# Patient Record
Sex: FEMALE | Race: BLACK OR AFRICAN AMERICAN | NOT HISPANIC OR LATINO | Employment: FULL TIME | URBAN - METROPOLITAN AREA
[De-identification: names, ages, dates, MRNs, and addresses within clinical notes are randomized per-mention and may not be internally consistent; named-entity substitution may affect disease eponyms.]

---
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
## 2023-01-11 DIAGNOSIS — R51.9 HEAD ACHE: Primary | ICD-10-CM

## 2023-01-12 DIAGNOSIS — J32.0 CHRONIC MAXILLARY SINUSITIS: Primary | ICD-10-CM

## 2023-02-08 DIAGNOSIS — N63.0 LUMP, BREAST: Primary | ICD-10-CM

## 2023-02-20 ENCOUNTER — HOSPITAL ENCOUNTER (OUTPATIENT)
Dept: RADIOLOGY | Facility: HOSPITAL | Age: 18
Discharge: HOME | End: 2023-02-20
Attending: INTERNAL MEDICINE
Payer: COMMERCIAL

## 2023-02-20 DIAGNOSIS — N63.0 LUMP, BREAST: ICD-10-CM

## 2023-02-20 PROCEDURE — 76642 ULTRASOUND BREAST LIMITED: CPT | Mod: TC,RT

## 2023-02-20 PROCEDURE — 76642 US BREAST RIGHT LIMITED: ICD-10-PCS | Mod: 26,RT,, | Performed by: STUDENT IN AN ORGANIZED HEALTH CARE EDUCATION/TRAINING PROGRAM

## 2023-02-20 PROCEDURE — 76642 ULTRASOUND BREAST LIMITED: CPT | Mod: 26,RT,, | Performed by: STUDENT IN AN ORGANIZED HEALTH CARE EDUCATION/TRAINING PROGRAM

## 2023-07-27 DIAGNOSIS — N63.0 BREAST LUMP: Primary | ICD-10-CM

## 2023-08-14 ENCOUNTER — OFFICE VISIT (OUTPATIENT)
Dept: SURGERY | Facility: CLINIC | Age: 18
End: 2023-08-14
Payer: COMMERCIAL

## 2023-08-14 VITALS
BODY MASS INDEX: 23.03 KG/M2 | SYSTOLIC BLOOD PRESSURE: 123 MMHG | HEART RATE: 70 BPM | DIASTOLIC BLOOD PRESSURE: 68 MMHG | RESPIRATION RATE: 18 BRPM | OXYGEN SATURATION: 100 % | WEIGHT: 122 LBS | TEMPERATURE: 98 F | HEIGHT: 61 IN

## 2023-08-14 DIAGNOSIS — Z91.89 AT HIGH RISK FOR BREAST CANCER: Primary | ICD-10-CM

## 2023-08-14 DIAGNOSIS — Z80.3 FAMILY HISTORY OF BREAST CANCER: ICD-10-CM

## 2023-08-14 DIAGNOSIS — R92.30 DENSE BREAST TISSUE: ICD-10-CM

## 2023-08-14 PROCEDURE — 1159F MED LIST DOCD IN RCRD: CPT | Mod: CPTII,S$GLB,, | Performed by: STUDENT IN AN ORGANIZED HEALTH CARE EDUCATION/TRAINING PROGRAM

## 2023-08-14 PROCEDURE — 3078F PR MOST RECENT DIASTOLIC BLOOD PRESSURE < 80 MM HG: ICD-10-PCS | Mod: CPTII,S$GLB,, | Performed by: STUDENT IN AN ORGANIZED HEALTH CARE EDUCATION/TRAINING PROGRAM

## 2023-08-14 PROCEDURE — 3074F SYST BP LT 130 MM HG: CPT | Mod: CPTII,S$GLB,, | Performed by: STUDENT IN AN ORGANIZED HEALTH CARE EDUCATION/TRAINING PROGRAM

## 2023-08-14 PROCEDURE — 3074F PR MOST RECENT SYSTOLIC BLOOD PRESSURE < 130 MM HG: ICD-10-PCS | Mod: CPTII,S$GLB,, | Performed by: STUDENT IN AN ORGANIZED HEALTH CARE EDUCATION/TRAINING PROGRAM

## 2023-08-14 PROCEDURE — 99204 OFFICE O/P NEW MOD 45 MIN: CPT | Mod: S$GLB,,, | Performed by: STUDENT IN AN ORGANIZED HEALTH CARE EDUCATION/TRAINING PROGRAM

## 2023-08-14 PROCEDURE — 3078F DIAST BP <80 MM HG: CPT | Mod: CPTII,S$GLB,, | Performed by: STUDENT IN AN ORGANIZED HEALTH CARE EDUCATION/TRAINING PROGRAM

## 2023-08-14 PROCEDURE — 1159F PR MEDICATION LIST DOCUMENTED IN MEDICAL RECORD: ICD-10-PCS | Mod: CPTII,S$GLB,, | Performed by: STUDENT IN AN ORGANIZED HEALTH CARE EDUCATION/TRAINING PROGRAM

## 2023-08-14 PROCEDURE — 1160F PR REVIEW ALL MEDS BY PRESCRIBER/CLIN PHARMACIST DOCUMENTED: ICD-10-PCS | Mod: CPTII,S$GLB,, | Performed by: STUDENT IN AN ORGANIZED HEALTH CARE EDUCATION/TRAINING PROGRAM

## 2023-08-14 PROCEDURE — 3008F BODY MASS INDEX DOCD: CPT | Mod: CPTII,S$GLB,, | Performed by: STUDENT IN AN ORGANIZED HEALTH CARE EDUCATION/TRAINING PROGRAM

## 2023-08-14 PROCEDURE — 3008F PR BODY MASS INDEX (BMI) DOCUMENTED: ICD-10-PCS | Mod: CPTII,S$GLB,, | Performed by: STUDENT IN AN ORGANIZED HEALTH CARE EDUCATION/TRAINING PROGRAM

## 2023-08-14 PROCEDURE — 99999 PR PBB SHADOW E&M-EST. PATIENT-LVL IV: CPT | Mod: PBBFAC,,, | Performed by: STUDENT IN AN ORGANIZED HEALTH CARE EDUCATION/TRAINING PROGRAM

## 2023-08-14 PROCEDURE — 1160F RVW MEDS BY RX/DR IN RCRD: CPT | Mod: CPTII,S$GLB,, | Performed by: STUDENT IN AN ORGANIZED HEALTH CARE EDUCATION/TRAINING PROGRAM

## 2023-08-14 PROCEDURE — 99204 PR OFFICE/OUTPT VISIT, NEW, LEVL IV, 45-59 MIN: ICD-10-PCS | Mod: S$GLB,,, | Performed by: STUDENT IN AN ORGANIZED HEALTH CARE EDUCATION/TRAINING PROGRAM

## 2023-08-14 PROCEDURE — 99999 PR PBB SHADOW E&M-EST. PATIENT-LVL IV: ICD-10-PCS | Mod: PBBFAC,,, | Performed by: STUDENT IN AN ORGANIZED HEALTH CARE EDUCATION/TRAINING PROGRAM

## 2023-08-14 RX ORDER — ERGOCALCIFEROL 1.25 MG/1
CAPSULE ORAL
COMMUNITY

## 2023-08-14 RX ORDER — MONTELUKAST SODIUM 10 MG/1
1 TABLET ORAL
COMMUNITY
End: 2023-10-05

## 2023-08-14 RX ORDER — FLUTICASONE PROPIONATE 50 MCG
SPRAY, SUSPENSION (ML) NASAL
COMMUNITY

## 2023-08-14 NOTE — PROGRESS NOTES
[facility] Breast Surg  Breast Surgical Oncology  New Patient Office Visit - H&P      Care Team: Patient Care Team:  No, Primary Doctor as PCP - General   Referring Provider: Dr. Celine Carnes    Chief Complaint:   Chief Complaint   Patient presents with    Breast Cyst     New patient w/ Breast Lump/cyst. No complaints of pain, swelling, d/c.          Subjective:      History of Present Illness:  Lenora Romero is a pleasant 18 y.o.female who presents as a referral from Dr. Carnes for palpable abnormality in the R breast UOQ and high risk evaluation.  Patient states she does not do regular self-breast exam.  She states her OB/GYN felt a mass in the upper-outer quadrant of the right breast back in January.  She underwent ultrasound of the right breast in February which did not show any suspicious findings.  She denies any other changes with her breasts.  She has never had any breast surgeries or biopsies in the past.  Her family history is significant for mom with breast cancer at 41, maternal aunt with breast cancer at 65, and paternal aunt with breast cancer at 40.  She is in school at  for business.    Imaging:   R breast US limited 2/20/23: Targeted sonographic evaluation of the right breast 9:00 -11:00 axes (including the palpable area of concern pointed out by the patient along the 10:00 axis) was performed, revealing no suspicious finding.  No sonographic correlate for the area of concern.  Additional real-time sonographic evaluation of the entire right breast was performed by me, revealing no suspicious sonographic finding.  No suspicious right axillary adenopathy.  While the right axillary lymph nodes are prominent, they are symmetric compared to the left axillary lymph nodes.    I have reviewed the imaging and agree with the radiologists interpretation. I have discussed these results with the patient.    Pathology:   none    OB / GYN History   Menarche  Onset:10  Menopause: Menopause: premenopausal  Hormonal birth control (duration): 0years  Pregnancies: 0  Age at first child birth: n/a  Child births: 0  Hysterectomy/Oophorectomy: no  HRT: no    Family History of Cancer (& age at diagnosis):  -   Family History   Problem Relation Age of Onset    Breast cancer Mother 41    Breast cancer Maternal Aunt 65    Breast cancer Paternal Aunt 40        Lifestyle:  Height and Weight:   BMI: Body mass index is 23.05 kg/m².     Other:  # of breast biopsies (when and pathology results): 0  MG breast density: No breast composition recorded.   Prior thoracic RT: none  Genetic testing: No  Ashkenazi Tenriism descent: No    Other History:     History reviewed. No pertinent past medical history.     History reviewed. No pertinent surgical history.     Social History     Socioeconomic History    Marital status: Single   Tobacco Use    Smoking status: Never    Smokeless tobacco: Never   Substance and Sexual Activity    Alcohol use: Never    Drug use: Never    Sexual activity: Not Currently          There is no immunization history on file for this patient.     Medications/Allergies:       Current Outpatient Medications   Medication Instructions    ergocalciferol (ERGOCALCIFEROL) 50,000 unit Cap take 1 capsule twice weekly x 4 weeks. When complete, take otc vitamin D 2000iu daily    fluticasone propionate (FLONASE) 50 mcg/actuation nasal spray As needed (PRN)    montelukast (SINGULAIR) 10 mg tablet 1 tablet, Oral, As needed (PRN)       Review of patient's allergies indicates:  No Known Allergies     Review of Systems:      ROS    Constitutional: denies fevers, chills, weight loss  HEENT: denies blurry/double vision, changes in hearing, odynophagia, dysphagia  Respiratory: denies cough, shortness of breath  Cardiovascular: denies palpitations, swelling of the extremities  GI: denies abdominal pain, nausea/vomiting, hematochezia, frequent stools  : denies frequency, dysuria, flank pain,  "hematuria  Skin: denies new rashes  Neurological: denies muscular/sensory deficiencies, loss of coordination, headaches, memory changes  Endo: denies hair loss/thinning, nervousness, hot flashes, heat/cold intolerance, lumps in the neck area  Heme: denies easy bruising and fatigue  Psychological: denies anxious/depressive moods  Musculoskeletal: denies bony pain, muscle cramps, swollen joints       Objective/Physical Exam   Visit Vitals  /68   Pulse 70   Temp 98.3 °F (36.8 °C) (Oral)   Resp 18   Ht 5' 1" (1.549 m)   Wt 55.3 kg (122 lb)   LMP 08/11/2023 (Exact Date)   SpO2 100%   BMI 23.05 kg/m²        Physical Exam    General: The patient is awake, alert and oriented. The patient is well nourished. No acute distress.  Neck: The neck is supple. The thyroid is not enlarged.  Musculoskeletal: The patient has a normal range of motion of her bilateral upper extremities.  Heart: Regular rate and rhythm, no murmurs.   Lung: No increased work of breathing. Clear breath sounds bilaterally.  Lymph nodes: There is no axillary, supraclavicular or cervical lymphadenopathy.  Breast:  Right:   On inspection there is no skin dimpling, rashes, retraction, erythema or skin abnormalities. The nipple areolar complex is normal with an everted nipple. The breasts move normally with movement of the pectoralis muscle. On palpation there are no dominant masses.  Very dense breast tissue UOQ. The breast tissue has a nodularity of 10 out of 10. There is no tenderness. There is no nipple discharge.  Left:   On inspection there is no skin dimpling, rashes, retraction, erythema or skin abnormalities. The nipple areolar complex is normal with an everted nipple. The breasts move normally with movement of the pectoralis muscle. On palpation there are no dominant masses.  Very dense breast tissue UOQ. The breast tissue has a nodularity of 10 out of 10. There is no tenderness. There is no nipple discharge.     Assessment and Plan     1. At high " risk for breast cancer    2. Dense breast tissue  - Ambulatory referral/consult to Breast Surgery    3. Family history of breast cancer    Lenora Romero is a pleasant 18 y.o.female who presents today for high risk evaluation and palpable concern in the R breast UOQ.  US was done and did not show any suspicious findings. On exam she has very dense breast tissue bilaterally especially in the UOQ without a dominant mass.  Today she was plugged into the [employer] risk calculator and her calculated risk of breast cancer based on Tyrer - Cuzick Breast Cancer Risk Model was 36%.  She understands that >20% is considered high risk.  Today we discussed risk factors associated with the development of breast cancer and risk reduction strategies.         1. Lifestyle factors: As with other types of cancer, studies continue to show that various lifestyle factors may contribute to the development of breast cancer.     Weight: Recent studies have shown that postmenopausal women who are overweight or obese have an increased risk of breast cancer. These women also have a higher risk of having the cancer come back after treatment.     Physical activity: Decreased physical activity is associated with an increased risk of developing breast cancer and a higher risk of having the cancer come back after treatment. Regular physical activity may protect against breast cancer by helping women maintain a healthy body weight, lowering hormone levels, or causing changes in a womens metabolism or immune factors.     Alcohol: Current research suggests that having more than 1 to 2 alcoholic drinks, including beer, wine, and spirits, per day raises the risk of breast cancer, as well as the risk of having the cancer come back after treatment.     Food: There is no reliable research that confirms that eating or avoiding specific foods reduces the risk of developing breast cancer or having the cancer come back after treatment. However,  eating more fruits and vegetables and fewer animal fats is linked with many health benefits.     2. Prevention:  Surgery to lower cancer risk and Drugs to lower cancer risk (Chemoprevention) were not discussed at todays visit.       3. Surveillance: Women with a known genetic mutation should follow screening guidelines per the NCCN guidelines. Women with no known genetic mutation  and found to be at greater than 20 percent average lifetime risk of breast cancer are recommended for the following screening recommendations:     Clinical Breast exam: Every 6-12 months starting at age found to be at increased risk by risk model     Mammogram: Per NCCN guidelines, recommended every year starting 10 years younger than the youngest breast cancer case in the family (but not before age 30). May consider beginning breast MRIs at age 30 per ACR guidelines if desired by patient or other clinical considerations. May also consider getting a baseline MG at time of initial high risk consultation.     Breast MRI: Per NCCN guidelines, recommended every year starting 10 years younger than the youngest breast cancer case in the family (but not before age 25). May consider beginning breast MRIs at age 30 per ACR guidelines if desired by patient or other clinical considerations. May also consider getting a baseline MG at time of initial high risk consultation. If patient has a first-degree relative with a BRCA1/2 gene mutation, youre encouraged to get genetic counseling and/or testing before getting MRI as part of screening (for those who do not wish to have genetic testing, MRI is recommended). Breast MRI in combination with mammography is better than mammography alone at finding breast cancer in certain women at higher than average risk.     --------------------------------------------------------------------------------------------------------------    PLAN:    1. Lifestyle - Healthy lifestyle guidelines were reviewed. She was  encouraged to engage in regular exercise, maintain a healthy body weight, and avoid excessive alcohol consumption. Healthy nutritional guidelines were also discussed.      2. Surveillance - She desires undergoing high risk screening with annual screening mammograms and breast MRIs which typically start at age 25 or 10 years prior to youngest family member that was diagnosed.      3. Genetics - According to NCCN guidelines, she does meet criteria for testing but is not interested at this time.     We also recommend and discussed performing a self breast exam monthly.  If she notices any changes in her breast exam in between visits she was encouraged to call the breast center to be seen as soon as possible. RTC in 3-6M for repeat breast exam to make sure there are no changes.  All questions were answered.        Stella Rainey MD  Breast Surgical Oncology       I spent a total of 45 minutes on the day of the visit.  This includes face to face time and non-face to face time preparing to see the patient (eg, review of tests), obtaining and/or reviewing separately obtained history, documenting clinical information in the electronic or other health record, independently interpreting results and communicating results to the patient/family/caregiver, or care coordinator.

## 2023-10-05 ENCOUNTER — HOSPITAL ENCOUNTER (EMERGENCY)
Facility: HOSPITAL | Age: 18
Discharge: HOME | End: 2023-10-05
Attending: INTERNAL MEDICINE
Payer: COMMERCIAL

## 2023-10-05 VITALS
HEIGHT: 61 IN | TEMPERATURE: 100 F | OXYGEN SATURATION: 98 % | DIASTOLIC BLOOD PRESSURE: 75 MMHG | BODY MASS INDEX: 21.36 KG/M2 | HEART RATE: 92 BPM | SYSTOLIC BLOOD PRESSURE: 119 MMHG | WEIGHT: 113.13 LBS | RESPIRATION RATE: 18 BRPM

## 2023-10-05 DIAGNOSIS — K59.00 CONSTIPATION: ICD-10-CM

## 2023-10-05 DIAGNOSIS — T78.40XA ALLERGIC REACTION, INITIAL ENCOUNTER: Primary | ICD-10-CM

## 2023-10-05 LAB
APPEARANCE UR: ABNORMAL
B-HCG UR QL: NEGATIVE
BILIRUB UR QL STRIP.AUTO: ABNORMAL
COLOR UR AUTO: ABNORMAL
CTP QC/QA: YES
GLUCOSE UR QL STRIP.AUTO: ABNORMAL
KETONES UR QL STRIP.AUTO: ABNORMAL
LEUKOCYTE ESTERASE UR QL STRIP.AUTO: ABNORMAL
NITRITE UR QL STRIP.AUTO: ABNORMAL
PH UR STRIP.AUTO: ABNORMAL [PH]
PROT UR QL STRIP.AUTO: ABNORMAL
RBC #/AREA URNS AUTO: >100 /HPF
RBC UR QL AUTO: ABNORMAL
SP GR UR STRIP.AUTO: 1.02 (ref 1–1.03)
UROBILINOGEN UR STRIP-ACNC: ABNORMAL
WBC #/AREA URNS AUTO: ABNORMAL /HPF

## 2023-10-05 PROCEDURE — 81001 URINALYSIS AUTO W/SCOPE: CPT | Performed by: INTERNAL MEDICINE

## 2023-10-05 PROCEDURE — 81025 URINE PREGNANCY TEST: CPT | Performed by: INTERNAL MEDICINE

## 2023-10-05 PROCEDURE — 96372 THER/PROPH/DIAG INJ SC/IM: CPT | Performed by: INTERNAL MEDICINE

## 2023-10-05 PROCEDURE — 87088 URINE BACTERIA CULTURE: CPT | Performed by: INTERNAL MEDICINE

## 2023-10-05 PROCEDURE — 99284 EMERGENCY DEPT VISIT MOD MDM: CPT

## 2023-10-05 PROCEDURE — 25000003 PHARM REV CODE 250: Performed by: INTERNAL MEDICINE

## 2023-10-05 PROCEDURE — 63600175 PHARM REV CODE 636 W HCPCS: Performed by: INTERNAL MEDICINE

## 2023-10-05 RX ORDER — CETIRIZINE HYDROCHLORIDE 10 MG/1
10 TABLET ORAL
Status: COMPLETED | OUTPATIENT
Start: 2023-10-05 | End: 2023-10-05

## 2023-10-05 RX ORDER — MONTELUKAST SODIUM 5 MG/1
10 TABLET, CHEWABLE ORAL DAILY
Status: DISCONTINUED | OUTPATIENT
Start: 2023-10-05 | End: 2023-10-05 | Stop reason: HOSPADM

## 2023-10-05 RX ORDER — DEXAMETHASONE SODIUM PHOSPHATE 4 MG/ML
8 INJECTION, SOLUTION INTRA-ARTICULAR; INTRALESIONAL; INTRAMUSCULAR; INTRAVENOUS; SOFT TISSUE
Status: COMPLETED | OUTPATIENT
Start: 2023-10-05 | End: 2023-10-05

## 2023-10-05 RX ORDER — MONTELUKAST SODIUM 10 MG/1
10 TABLET ORAL NIGHTLY
Qty: 10 TABLET | Refills: 0 | Status: SHIPPED | OUTPATIENT
Start: 2023-10-05 | End: 2023-10-15

## 2023-10-05 RX ORDER — PREDNISONE 20 MG/1
40 TABLET ORAL DAILY
Qty: 10 TABLET | Refills: 0 | Status: SHIPPED | OUTPATIENT
Start: 2023-10-05 | End: 2023-10-10

## 2023-10-05 RX ADMIN — MONTELUKAST SODIUM 10 MG: 5 TABLET, CHEWABLE ORAL at 08:10

## 2023-10-05 RX ADMIN — DEXAMETHASONE SODIUM PHOSPHATE 8 MG: 4 INJECTION, SOLUTION INTRA-ARTICULAR; INTRALESIONAL; INTRAMUSCULAR; INTRAVENOUS; SOFT TISSUE at 08:10

## 2023-10-05 RX ADMIN — CETIRIZINE HYDROCHLORIDE 10 MG: 10 TABLET, FILM COATED ORAL at 08:10

## 2023-10-05 NOTE — ED PROVIDER NOTES
"Encounter Date: 10/5/2023       History     Chief Complaint   Patient presents with    Allergic Reaction     C/o eye swelling and lip swelling x 2 days since started on meds Tuesday . Stated went to urgent care due to being sick sunday     Presents concern about an allergic reaction. States went to a local urgent care due to constipation and was diagnosed with UTI. Bactrim, Zofran and lactulose were prescribed after which started having swelling lips, periorbital area and feeling her throat also "funny".  No rash.    The history is provided by the patient and a relative.     Review of patient's allergies indicates:  No Known Allergies  History reviewed. No pertinent past medical history.  History reviewed. No pertinent surgical history.  Family History   Problem Relation Age of Onset    Breast cancer Mother 41    Breast cancer Maternal Aunt 65    Breast cancer Paternal Aunt 40     Social History     Tobacco Use    Smoking status: Never    Smokeless tobacco: Never   Substance Use Topics    Alcohol use: Never    Drug use: Never     Review of Systems   Constitutional:  Negative for fever.   HENT:  Positive for facial swelling. Negative for sore throat, trouble swallowing and voice change.    Respiratory:  Negative for shortness of breath.    Cardiovascular:  Negative for chest pain.   Gastrointestinal:  Positive for constipation and nausea.   Genitourinary:  Positive for vaginal bleeding (In her menstrual period). Negative for dysuria.   Musculoskeletal:  Negative for back pain.   Skin:  Negative for rash.   Neurological:  Negative for weakness.   Hematological:  Does not bruise/bleed easily.   All other systems reviewed and are negative.      Physical Exam     Initial Vitals [10/05/23 0753]   BP Pulse Resp Temp SpO2   106/66 110 20 99.7 °F (37.6 °C) 98 %      MAP       --         Physical Exam    Nursing note and vitals reviewed.  Constitutional: She appears well-developed and well-nourished. No distress.   HENT: "   Head: Normocephalic and atraumatic.   Nose: Nose normal.   Mouth/Throat: Oropharynx is clear and moist. No oropharyngeal exudate.   Mild lips swelling and periorbital   Eyes: Conjunctivae and EOM are normal. Pupils are equal, round, and reactive to light.   Neck: Neck supple. No JVD present.   Normal range of motion.  Cardiovascular:  Normal rate, regular rhythm, normal heart sounds and intact distal pulses.           Pulmonary/Chest: Breath sounds normal. No stridor.   Abdominal: Abdomen is soft. Bowel sounds are normal. She exhibits no distension. There is no abdominal tenderness. There is no rebound and no guarding.   Musculoskeletal:         General: No edema. Normal range of motion.      Cervical back: Normal range of motion and neck supple.     Neurological: She is alert and oriented to person, place, and time. She has normal strength. GCS score is 15. GCS eye subscore is 4. GCS verbal subscore is 5. GCS motor subscore is 6.   Skin: Skin is warm and dry. No rash noted.   Psychiatric: Her behavior is normal.         ED Course   Procedures  Labs Reviewed   URINALYSIS - Abnormal; Notable for the following components:       Result Value    Color, UA Red (*)     Appearance, UA Bloody (*)     WBC, UA 11-20 (*)     RBC, UA >100 (*)     All other components within normal limits   CULTURE, URINE   POCT URINE PREGNANCY          Imaging Results              X-Ray Abdomen Flat And Erect (Final result)  Result time 10/05/23 09:08:57      Final result by Lion Hurst MD (10/05/23 09:08:57)                   Impression:      No acute radiographic findings.      Electronically signed by: Lion Hurst  Date:    10/05/2023  Time:    09:08               Narrative:    EXAMINATION:  XR ABDOMEN FLAT AND ERECT    CLINICAL HISTORY:  Constipation, unspecified    COMPARISON:  None    FINDINGS:  Flat and upright views of the abdomen demonstrate a nonspecific, nonobstructive bowel gas pattern.  Moderate stool is scattered in the  colon.  No free air is seen.                                       Medications   montelukast chewable tablet 10 mg (10 mg Oral Given 10/5/23 0819)   dexAMETHasone injection 8 mg (8 mg Intramuscular Given 10/5/23 0819)   cetirizine tablet 10 mg (10 mg Oral Given 10/5/23 0819)     Medical Decision Making  Amount and/or Complexity of Data Reviewed  Labs: ordered.  Radiology: ordered.    Risk  OTC drugs.  Prescription drug management.                               Clinical Impression:   Final diagnoses:  [K59.00] Constipation  [T78.40XA] Allergic reaction, initial encounter (Primary)        ED Disposition Condition    Discharge Stable          ED Prescriptions       Medication Sig Dispense Start Date End Date Auth. Provider    predniSONE (DELTASONE) 20 MG tablet Take 2 tablets (40 mg total) by mouth once daily. for 5 days 10 tablet 10/5/2023 10/10/2023 Tano Garcia MD    montelukast (SINGULAIR) 10 mg tablet Take 1 tablet (10 mg total) by mouth every evening. for 10 days 10 tablet 10/5/2023 10/15/2023 Tano Garcia MD          Follow-up Information       Follow up With Specialties Details Why Contact Info Additional Information    [facility] Emergency Medicine  If symptoms worsen [facility] [facility] [address] [phone]     [facility] Internal Medicine Schedule an appointment as soon as possible for a visit in 2 months  [facility] [facility] [address] [phone] Internal Medicine Clinic Entrance #1             Tano Garcia MD  10/05/23 0928

## 2023-10-05 NOTE — Clinical Note
"Lenora "Lenora" Nick was seen and treated in our emergency department on 10/5/2023.  She may return to work on 10/06/2023.       If you have any questions or concerns, please don't hesitate to call.       RN    "

## 2023-10-05 NOTE — Clinical Note
"Lenora Knox" Nick was seen and treated in our emergency department on 10/5/2023.  She may return to school on 10/06/2023.      If you have any questions or concerns, please don't hesitate to call.       RN"

## 2023-10-07 LAB — BACTERIA UR CULT: NORMAL

## 2024-02-14 ENCOUNTER — OFFICE VISIT (OUTPATIENT)
Dept: SURGERY | Facility: CLINIC | Age: 19
End: 2024-02-14
Payer: COMMERCIAL

## 2024-02-14 VITALS
HEIGHT: 61 IN | DIASTOLIC BLOOD PRESSURE: 65 MMHG | WEIGHT: 124 LBS | HEART RATE: 79 BPM | RESPIRATION RATE: 18 BRPM | TEMPERATURE: 98 F | OXYGEN SATURATION: 99 % | SYSTOLIC BLOOD PRESSURE: 111 MMHG | BODY MASS INDEX: 23.41 KG/M2

## 2024-02-14 DIAGNOSIS — Z12.39 ENCOUNTER FOR BREAST CANCER SCREENING OTHER THAN MAMMOGRAM: ICD-10-CM

## 2024-02-14 DIAGNOSIS — Z91.89 AT HIGH RISK FOR BREAST CANCER: ICD-10-CM

## 2024-02-14 DIAGNOSIS — R92.30 DENSE BREAST TISSUE: Primary | ICD-10-CM

## 2024-02-14 DIAGNOSIS — Z80.3 FAMILY HISTORY OF BREAST CANCER: ICD-10-CM

## 2024-02-14 PROCEDURE — 3008F BODY MASS INDEX DOCD: CPT | Mod: CPTII,S$GLB,, | Performed by: STUDENT IN AN ORGANIZED HEALTH CARE EDUCATION/TRAINING PROGRAM

## 2024-02-14 PROCEDURE — 3078F DIAST BP <80 MM HG: CPT | Mod: CPTII,S$GLB,, | Performed by: STUDENT IN AN ORGANIZED HEALTH CARE EDUCATION/TRAINING PROGRAM

## 2024-02-14 PROCEDURE — 1159F MED LIST DOCD IN RCRD: CPT | Mod: CPTII,S$GLB,, | Performed by: STUDENT IN AN ORGANIZED HEALTH CARE EDUCATION/TRAINING PROGRAM

## 2024-02-14 PROCEDURE — 99213 OFFICE O/P EST LOW 20 MIN: CPT | Mod: S$GLB,,, | Performed by: STUDENT IN AN ORGANIZED HEALTH CARE EDUCATION/TRAINING PROGRAM

## 2024-02-14 PROCEDURE — 3074F SYST BP LT 130 MM HG: CPT | Mod: CPTII,S$GLB,, | Performed by: STUDENT IN AN ORGANIZED HEALTH CARE EDUCATION/TRAINING PROGRAM

## 2024-02-14 PROCEDURE — 99999 PR PBB SHADOW E&M-EST. PATIENT-LVL III: CPT | Mod: PBBFAC,,, | Performed by: STUDENT IN AN ORGANIZED HEALTH CARE EDUCATION/TRAINING PROGRAM

## 2024-02-14 PROCEDURE — 1160F RVW MEDS BY RX/DR IN RCRD: CPT | Mod: CPTII,S$GLB,, | Performed by: STUDENT IN AN ORGANIZED HEALTH CARE EDUCATION/TRAINING PROGRAM

## 2024-02-14 NOTE — PROGRESS NOTES
[facility] Breast Surg  Breast Surgical Oncology  FU Patient Office Visit - H&P      Care Team: Patient Care Team:  Celine Carnes MD as PCP - General (Internal Medicine)   Referring Provider: No ref. provider found    Chief Complaint:   Chief Complaint   Patient presents with    Follow-up     6 month follow up visit.          Subjective:      Interval: Lenora Romero is a 18 y.o.female who presents today for high risk FU. She is doing well. She denies any breast changes including new masses, skin changes, skin lesions, pain, nipple discharge or retraction.  Denies any changes in her family history.     History of Present Illness:  Lenora Romero is a pleasant 18 y.o.female who presents as a referral from Dr. Carnes for palpable abnormality in the R breast UOQ and high risk evaluation.  Patient states she does not do regular self-breast exam.  She states her OB/GYN felt a mass in the upper-outer quadrant of the right breast back in January.  She underwent ultrasound of the right breast in February which did not show any suspicious findings.  She denies any other changes with her breasts.  She has never had any breast surgeries or biopsies in the past.  Her family history is significant for mom with breast cancer at 41 who passed away from the cancer, maternal aunt with breast cancer at 65, and paternal aunt with breast cancer at 40.  She is in school at  for business and plans to get her masters.    Imaging:   R breast US limited 2/20/23: Targeted sonographic evaluation of the right breast 9:00 -11:00 axes (including the palpable area of concern pointed out by the patient along the 10:00 axis) was performed, revealing no suspicious finding.  No sonographic correlate for the area of concern.  Additional real-time sonographic evaluation of the entire right breast was performed by me, revealing no suspicious sonographic finding.  No suspicious right axillary adenopathy.  While the  right axillary lymph nodes are prominent, they are symmetric compared to the left axillary lymph nodes.    I have reviewed the imaging and agree with the radiologists interpretation. I have discussed these results with the patient.    Pathology:   none    OB / GYN History   Menarche Onset:10  Menopause: Menopause: premenopausal  Hormonal birth control (duration): 0years  Pregnancies: 0  Age at first child birth: n/a  Child births: 0  Hysterectomy/Oophorectomy: no  HRT: no    Family History of Cancer (& age at diagnosis):  -   Family History   Problem Relation Age of Onset    Breast cancer Mother 41    Breast cancer Maternal Aunt 65    Breast cancer Paternal Aunt 40        Lifestyle:  Height and Weight:   BMI: Body mass index is 23.43 kg/m².     Other:  # of breast biopsies (when and pathology results): 0  MG breast density:  Prior thoracic RT: none  Genetic testing: No  Ashkenazi Spiritism descent: No    Other History:     History reviewed. No pertinent past medical history.     History reviewed. No pertinent surgical history.     Social History     Socioeconomic History    Marital status: Single   Tobacco Use    Smoking status: Never    Smokeless tobacco: Never   Substance and Sexual Activity    Alcohol use: Never    Drug use: Never    Sexual activity: Not Currently          There is no immunization history on file for this patient.     Medications/Allergies:       Current Outpatient Medications   Medication Instructions    ergocalciferol (ERGOCALCIFEROL) 50,000 unit Cap take 1 capsule twice weekly x 4 weeks. When complete, take otc vitamin D 2000iu daily    fluticasone propionate (FLONASE) 50 mcg/actuation nasal spray As needed (PRN)       Review of patient's allergies indicates:   Allergen Reactions    Sulfa (sulfonamide antibiotics) Swelling        Review of Systems:      ROS  See HPI for pertinent ROS       Objective/Physical Exam   Visit Vitals  /65   Pulse 79   Temp 98.4 °F (36.9 °C) (Oral)   Resp 18   Ht  "5' 1" (1.549 m)   Wt 56.2 kg (124 lb)   LMP 01/28/2024 (Exact Date)   SpO2 99%   BMI 23.43 kg/m²        Physical Exam    General: The patient is awake, alert and oriented. The patient is well nourished. No acute distress.  Neck: The neck is supple. The thyroid is not enlarged.  Musculoskeletal: The patient has a normal range of motion of her bilateral upper extremities.  Heart: Regular rate and rhythm, no murmurs.   Lung: No increased work of breathing. Clear breath sounds bilaterally.  Lymph nodes: There is no axillary, supraclavicular or cervical lymphadenopathy.  Breast:  Right:   On inspection there is no skin dimpling, rashes, retraction, erythema or skin abnormalities. The nipple areolar complex is normal with an everted nipple. The breasts move normally with movement of the pectoralis muscle. On palpation there are no dominant masses.  Very dense breast tissue UOQ. There is no tenderness. There is no nipple discharge.  Left:   On inspection there is no skin dimpling, rashes, retraction, erythema or skin abnormalities. The nipple areolar complex is normal with an everted nipple. The breasts move normally with movement of the pectoralis muscle. On palpation there are no dominant masses.  Very dense breast tissue UOQ. There is no tenderness. There is no nipple discharge.     Assessment and Plan     1. Dense breast tissue    2. At high risk for breast cancer    3. Family history of breast cancer    4. Encounter for breast cancer screening other than mammogram      Lenora Romero is a pleasant 18 y.o.female who presents today for high risk FU. She is doing well and there are no suspicious findings on her breast exam today. She was plugged into the [employer] risk calculator and her calculated risk of breast cancer based on Tyrer - Cuzick Breast Cancer Risk Model was 36%.  She understands that >20% is considered high risk.  Today we discussed risk factors associated with the development of breast " cancer and risk reduction strategies.       --------------------------------------------------------------------------------------------------------------    PLAN:    1. Lifestyle - Healthy lifestyle guidelines were reviewed. She was encouraged to engage in regular exercise, maintain a healthy body weight, and avoid excessive alcohol consumption. Healthy nutritional guidelines were also discussed.      2. Surveillance - She desires undergoing high risk screening with annual screening mammograms and breast MRIs which typically start at age 25 or 10 years prior to youngest family member that was diagnosed.      3. Genetics - According to NCCN guidelines, she does meet criteria for testing and would like to hold off on testing now but will plan to test in the next few years.     4. Recommend 2 clinical breast exams each year - one with her OB/GYN and the other at the breast center.     We also recommend and discussed performing a self breast exam monthly.  If she notices any changes in her breast exam in between visits she was encouraged to call the breast center to be seen as soon as possible. RTC in 1 year. All questions were answered.        Stella Rainey MD  Breast Surgical Oncology       I spent a total of 20 minutes on the day of the visit.  This includes face to face time and non-face to face time preparing to see the patient (eg, review of tests), obtaining and/or reviewing separately obtained history, documenting clinical information in the electronic or other health record, independently interpreting results and communicating results to the patient/family/caregiver, or care coordinator.

## 2025-02-19 NOTE — PROGRESS NOTES
[facility] Breast Surg  Breast Surgical Oncology  FU Patient Office Visit - H&P      Care Team: Patient Care Team:  Celine Carnes MD as PCP - General (Internal Medicine)  Stella Rainey MD as Consulting Physician (Breast Surgery)   Referring Provider: No ref. provider found    Chief Complaint:   Chief Complaint   Patient presents with    Follow-up     Patient denies any breast related concerns today           Subjective:      Interval: Lenora Romero is a 19 y.o.female who presents today for high risk FU. She is doing well. She denies any breast changes including new masses, skin changes, skin lesions, pain, nipple discharge or retraction.  She doesn't do SBE on a regular basis. She is now in graduate school.     History of Present Illness:  Lenora Romero is a pleasant 19 y.o.female who presents as a referral from Dr. Carnes for palpable abnormality in the R breast UOQ and high risk evaluation.  Patient states she does not do regular self-breast exam.  She states her OB/GYN felt a mass in the upper-outer quadrant of the right breast back in January.  She underwent ultrasound of the right breast in February which did not show any suspicious findings.  She denies any other changes with her breasts.  She has never had any breast surgeries or biopsies in the past.  Her family history is significant for mom with breast cancer at 41 who passed away from the cancer, maternal aunt with breast cancer at 65, and paternal aunt with breast cancer at 40.  She is in school at  for business and plans to get her masters.    Imaging:   R breast US limited 2/20/23: Targeted sonographic evaluation of the right breast 9:00 -11:00 axes (including the palpable area of concern pointed out by the patient along the 10:00 axis) was performed, revealing no suspicious finding.  No sonographic correlate for the area of concern.  Additional real-time sonographic evaluation of the entire right breast was  performed by me, revealing no suspicious sonographic finding.  No suspicious right axillary adenopathy.  While the right axillary lymph nodes are prominent, they are symmetric compared to the left axillary lymph nodes.    Pathology:   none    OB / GYN History   Menarche Onset:10  Menopause: Menopause: premenopausal  Hormonal birth control (duration): 0years  Pregnancies: 0  Age at first child birth: n/a  Child births: 0  Hysterectomy/Oophorectomy: no  HRT: no    Family History of Cancer (& age at diagnosis):  -   Family History   Problem Relation Name Age of Onset    Breast cancer Mother  41    Breast cancer Maternal Aunt  65    Breast cancer Paternal Aunt  40        Lifestyle:  Height and Weight:   BMI: Body mass index is 22.98 kg/m².     Other:  # of breast biopsies (when and pathology results): 0  MG breast density:  Prior thoracic RT: none  Genetic testing: No  Ashkenazi Sabianist descent: No    Other History:     History reviewed. No pertinent past medical history.     History reviewed. No pertinent surgical history.     Social History     Socioeconomic History    Marital status: Single   Tobacco Use    Smoking status: Never    Smokeless tobacco: Never   Substance and Sexual Activity    Alcohol use: Never    Drug use: Never    Sexual activity: Not Currently   Social History Narrative    ** Merged History Encounter **               There is no immunization history on file for this patient.     Medications/Allergies:       Current Outpatient Medications   Medication Instructions    ergocalciferol (ERGOCALCIFEROL) 50,000 unit Cap take 1 capsule twice weekly x 4 weeks. When complete, take otc vitamin D 2000iu daily    fluticasone propionate (FLONASE) 50 mcg/actuation nasal spray As needed (PRN)       Review of patient's allergies indicates:   Allergen Reactions    Sulfa (sulfonamide antibiotics) Swelling        Review of Systems:      ROS  See HPI for pertinent ROS       Objective/Physical Exam   Visit Vitals  BP  "121/81 (BP Location: Right arm, Patient Position: Sitting)   Pulse 81   Temp 98.1 °F (36.7 °C) (Oral)   Resp 18   Ht 5' 1" (1.549 m)   Wt 55.2 kg (121 lb 9.6 oz)   LMP 01/28/2025 (Approximate)   SpO2 97%   BMI 22.98 kg/m²          Physical Exam    General: The patient is awake, alert and oriented. The patient is well nourished. No acute distress.  Neck: The neck is supple. The thyroid is not enlarged.  Musculoskeletal: The patient has a normal range of motion of her bilateral upper extremities.  Heart: Regular rate and rhythm, no murmurs.   Lung: No increased work of breathing. Clear breath sounds bilaterally.  Lymph nodes: There is no axillary, supraclavicular or cervical lymphadenopathy.  Breast:  Right:   On inspection there is no skin dimpling, rashes, retraction, erythema or skin abnormalities. The nipple areolar complex is normal with an everted nipple. The breasts move normally with movement of the pectoralis muscle. On palpation there are no dominant masses.  Very dense breast tissue UOQ. There is no tenderness. There is no nipple discharge.  Left:   On inspection there is no skin dimpling, rashes, retraction, erythema or skin abnormalities. The nipple areolar complex is normal with an everted nipple. The breasts move normally with movement of the pectoralis muscle. On palpation there are no dominant masses.  Very dense breast tissue UOQ. There is no tenderness. There is no nipple discharge.     Assessment and Plan     1. At high risk for breast cancer    2. Family history of breast cancer      Lenora Romero is a pleasant 19 y.o.female who presents today for high risk FU. She is doing well and there are no suspicious findings on her breast exam today. Her calculated risk of breast cancer based on Tyrer - Cuzick Breast Cancer Risk Model was 36%.  She understands that >20% is considered high risk.  Today we discussed risk factors associated with the development of breast cancer and risk reduction strategies.  "      --------------------------------------------------------------------------------------------------------------    PLAN:    1. Lifestyle - Healthy lifestyle guidelines were reviewed. She was encouraged to engage in regular exercise, maintain a healthy body weight, and avoid excessive alcohol consumption. Healthy nutritional guidelines were also discussed.  Recommend performing a self breast exam monthly.    2. Surveillance - She desires undergoing high risk screening with annual screening mammograms and breast MRIs which typically start at age 25 or 10 years prior to youngest family member that was diagnosed.      3. Genetics - According to NCCN guidelines, she does meet criteria for testing and would like referral to the genetic counselor. Referral placed.     4. Recommend 2 clinical breast exams each year - one with her OB/GYN and the other at the breast center.     If she notices any changes in her breast exam in between visits she was encouraged to call the breast center to be seen as soon as possible. RTC in 1 year. All questions were answered.        Stella Rainey MD  Breast Surgical Oncology       I spent a total of 20 minutes on the day of the visit.  This includes face to face time and non-face to face time preparing to see the patient (eg, review of tests), obtaining and/or reviewing separately obtained history, documenting clinical information in the electronic or other health record, independently interpreting results and communicating results to the patient/family/caregiver, or care coordinator.

## 2025-02-20 ENCOUNTER — OFFICE VISIT (OUTPATIENT)
Dept: SURGERY | Facility: CLINIC | Age: 20
End: 2025-02-20
Payer: COMMERCIAL

## 2025-02-20 VITALS
HEIGHT: 61 IN | BODY MASS INDEX: 22.96 KG/M2 | OXYGEN SATURATION: 97 % | TEMPERATURE: 98 F | HEART RATE: 81 BPM | RESPIRATION RATE: 18 BRPM | SYSTOLIC BLOOD PRESSURE: 121 MMHG | DIASTOLIC BLOOD PRESSURE: 81 MMHG | WEIGHT: 121.63 LBS

## 2025-02-20 DIAGNOSIS — Z91.89 AT HIGH RISK FOR BREAST CANCER: Primary | ICD-10-CM

## 2025-02-20 DIAGNOSIS — Z80.3 FAMILY HISTORY OF BREAST CANCER: ICD-10-CM

## 2025-02-20 PROCEDURE — 1159F MED LIST DOCD IN RCRD: CPT | Mod: CPTII,S$GLB,, | Performed by: STUDENT IN AN ORGANIZED HEALTH CARE EDUCATION/TRAINING PROGRAM

## 2025-02-20 PROCEDURE — 99999 PR PBB SHADOW E&M-EST. PATIENT-LVL IV: CPT | Mod: PBBFAC,,, | Performed by: STUDENT IN AN ORGANIZED HEALTH CARE EDUCATION/TRAINING PROGRAM

## 2025-02-20 PROCEDURE — 3079F DIAST BP 80-89 MM HG: CPT | Mod: CPTII,S$GLB,, | Performed by: STUDENT IN AN ORGANIZED HEALTH CARE EDUCATION/TRAINING PROGRAM

## 2025-02-20 PROCEDURE — 3008F BODY MASS INDEX DOCD: CPT | Mod: CPTII,S$GLB,, | Performed by: STUDENT IN AN ORGANIZED HEALTH CARE EDUCATION/TRAINING PROGRAM

## 2025-02-20 PROCEDURE — 99213 OFFICE O/P EST LOW 20 MIN: CPT | Mod: S$GLB,,, | Performed by: STUDENT IN AN ORGANIZED HEALTH CARE EDUCATION/TRAINING PROGRAM

## 2025-02-20 PROCEDURE — 3074F SYST BP LT 130 MM HG: CPT | Mod: CPTII,S$GLB,, | Performed by: STUDENT IN AN ORGANIZED HEALTH CARE EDUCATION/TRAINING PROGRAM

## 2025-02-20 PROCEDURE — 1160F RVW MEDS BY RX/DR IN RCRD: CPT | Mod: CPTII,S$GLB,, | Performed by: STUDENT IN AN ORGANIZED HEALTH CARE EDUCATION/TRAINING PROGRAM

## 2025-03-31 ENCOUNTER — OFFICE VISIT (OUTPATIENT)
Dept: SURGERY | Facility: CLINIC | Age: 20
End: 2025-03-31
Payer: COMMERCIAL

## 2025-03-31 DIAGNOSIS — Z91.89 AT HIGH RISK FOR BREAST CANCER: ICD-10-CM

## 2025-03-31 DIAGNOSIS — Z80.3 FAMILY HISTORY OF BREAST CANCER: ICD-10-CM

## 2025-03-31 PROCEDURE — 96041 GENETIC COUNSELING SVC EA 30: CPT | Mod: S$GLB,,,

## 2025-03-31 PROCEDURE — 99999 PR PBB SHADOW E&M-EST. PATIENT-LVL II: CPT | Mod: PBBFAC,,,

## 2025-03-31 NOTE — PROGRESS NOTES
"  Cancer Genetics  [facility] - Breast Surgery  [facility]          Date of Service:  2025  Provider:  Nohemy Rosas MS, [facility]  Collaborating physician: Janice Mendoza MD    Patient Information  Name:  Lenora Romero  :  2005  MRN:  [id number]        Referring Provider: Stella Rainey MD     Visit type: in-person.   Face-to-face time with patient: approximately 34 minutes.  Approximately 75 minutes in total were spent on this encounter, which includes face-to-face time and non-face-to-face time preparing to see the patient (e.g., review of tests), obtaining and/or reviewing separately obtained history, documenting clinical information in the electronic or other health record, independently interpreting results (not   reported) and communicating results to the patient/family/caregiver, or care coordination (not separately reported).      SUBJECTIVE:      Reason for Referral: Family history of breast cancer; At high risk for breast cancer    History of Present Illness (HPI):  Lenora Romero ("Lenora"), 19 y.o., assigned female sex at birth is established to [facility] - Breast Surgery department but new to me.  She was referred by Breast Surgery for genetic cancer risk assessment given her family history of breast cancer. She has no personal history of cancer. She was referred from Dr. Carnes for palpable abnormality in the R breast UOQ and high risk evaluation. She states her OB/GYN felt a mass in the upper-outer quadrant of the right breast back in 2023.  She underwent ultrasound of the right breast in 2023 which did not show any suspicious findings.     Focused Medical History:   Germline cancer-genetic testing:  No  Cancer:  No  Colonoscopy: No  Mammogram: No  Right breast u/s (2023)  Breast MRI:  No  Other benign tumor:  No  Pancreatitis:  No  Pancreatic cyst:  No  Reproductive organs intact    Breast-Specific " Medication refill denied refilled on 4/26/18 DULoxetine (CYMBALTA) 60 MG capsule   "History  Height:  5'1"  Weight:  125 lbs  Breast density per BI-RADS:  unknown  Age at menarche:  10 years  Age at first live birth:  unknown  Menopause:  premenopausal   HRT usage:  never  BRCA testing:  No  Personal history of ovarian cancer:  No  Personal history of breast biopsy:  no  Ashkenazi Confucianist inheritance:  No  History of XRT to chest wall:  No  Family history:  as detailed in pedigree/family history    Ancestry:  Ashkenazi Confucianist ancestry:  No  Paternal:    Maternal:     Focused Family History:  Consanguinity in ancestors:  No  Germline cancer-genetic testing in blood relatives:  Yes - Maternal 1st cousin - once removed  Cancer in family:  Yes; there are no known blood relatives affected with cancer other than those mentioned in the pedigree below    Family Cancer Pedigree:             Review of Systems:  See HPI.      SUBJECTIVE:   Records Reviewed  Medical History  Problem List  Any pertinent, available Procedures and Pathology reports in both [facility] and [facility]    COUNSELING   Causes of cancer  Germline cancer genetic testing is the testing of genes associated with cancer, known as cancer susceptibility genes.  Just as these genes are inherited from parents, mutations in these genes can be inherited, as well.  A mutation in a cancer susceptibility gene adversely affects the gene's ability to prevent cancer; therefore, carriers of cancer susceptibility gene mutations may be at increased risk for certain cancers.     Only 5-10% cancers are caused by a cancer susceptibility gene mutation, meaning the cancer is genetic/hereditary; rather, most cancers are sporadic.  Causes of sporadic cancers may include environmental risk factors, lifestyle risk factors, and non-modifiable risk factors.  It is important to note that members of a family often share not only their genetics but also risk factors including environmental and lifestyle risk factors, so " cancers can be familial.    Mutations in BRCA1 and BRCA2 are associated with an increased risk for breast and ovarian cancer, in addition to male breast cancer, pancreatic, melanoma, and prostate cancer. Mutations in other genes may increase the risk of breast and prostate cancer, in addition to other cancers.      Potential results of genetic testing, and their implications  Potential results of genetic testing include positive, negative, and variant of unknown significance (VUS).    A positive result indicates the presence of at least one clinically significant mutation, and the patient's associated cancer risks vary depending upon the cancer susceptibility gene(s) in which there is/are a mutation(s).  With a positive result, in some cases, depending upon the specific result and the patient's clinical history, modified risk management may be recommended, including measures for risk reduction and/or surveillance; however, modified management is not always an option.    A negative result indicates that no clinically significant mutations were identified in the gene(s) tested.    A VUS indicates that there is not presently enough data for the laboratory to make a determination as to whether the variant is clinically significant.  VUSs are not typically acted upon clinically.       The ability to interpret the meaning of a negative genetic testing result in genes associated with cancer with which the patient has not personally been affected, when done prior to testing the appropriate affected relative(s), is significantly limited.  A negative result in the patient does not indicate that she cannot develop cancer, and, in fact, the patient may even be at increased risk for cancer based on shared risk factors with affected relatives.  The most informative candidates for initial genetic testing in a family are those who have been affected with cancer.     Modified management may also be recommended, even with a result of  no or unknown significance, based upon risk assessment that incorporates the family history.      Tyer Cuzik Score  Breast Cancer Risk Stratification  Current estimated lifetime risk of breast cancer, as recalculated (given corrected relationship with affected relatives) utilizing the Tyrer-Cuzick risk-assessment model v8.0b:  23.2%.  This places the patient in the high-risk range according to current clinical guidelines.    As such, the NCCN guidelines recommend that she:  See a healthcare provider to review personal and family history, have her breast cancer risk assessed (her risk is not static and can change), discuss breast cancer risk reduction, and undergo a clinical breast exam.  This visit would typically be with a gynecologist or a breast healthcare specialist, should start at age 25, and should occur annually.  Practice breast awareness, which means that you are familiar with her breasts and perform periodic (I recommend at least monthly), consistent breast self-exams and promptly report to a healthcare provider (typically, a gynecologist or a breast healthcare specialist) any changes or concerns.  Breast awareness should begin by age 25.  Undergo a screening mammogram (preferably, one that is 3-D) annually.  This can be ordered by her primary care provider, gynecologist, or breast healthcare specialist and should begin at age 40 or 10 years prior to the youngest diagnosis in the family.  Consider meeting with a breast healthcare specialist to determine whether her would benefit from additional screenings for breast cancer, such as breast MRI.     Genetic mutation inheritance  If  Lenora tests positive for a mutation, her first-degree relatives would each have a 50% chance of having the same mutation, and other, more distantly related blood-relatives would also be at risk of having the same mutation.       Genetic discrimination  The Genetic Information Nondiscrimination Act (ADALBERTO) is [place] federal  legislation that provides some protections against use of an individual's genetic information by their health insurer and by their employer.  Title I of [place] prohibits most health insurers from utilizing an individual's genetic information to make decisions regarding insurance eligibility or premium charges.  Title II of ADAL[place]ERTO prohibits covered entities, including employers, from requesting the genetic information of employees and applicants.  ADALBERTO does not protect individuals from genetic discrimination toward health insurance obtained through a job with the  or through the Federal Employees Health Benefits Plan; from genetic discrimination by employers with fewer than 15 employees or if employed by the ; or from genetic discrimination by any other type of policies/entities, including but not limited to life insurance, disability insurance, long-term care insurance,  benefits, and [facility] benefits.     Genetic testing logistics  An outside laboratory would perform the testing after a blood sample is collected or a saliva sample is collected by the patient at home.  With genetic testing, there is a potential for the patient to incur out-of-pocket costs.  Results can take 2-3 weeks.  Post-test genetic counseling can be conducted once the genetic testing results are available.     Based on the information provided by  Lenora, she meets current criteria for genetic testing of hereditary breast cancer according to current clinical guidelines. Lenora's clinical history, including personal history and/or family history, is strongly suggestive of a hereditary cancer syndrome in the family given the family history of breast cancer, including a first degree relative diagnosed with breast cancer before age 50.    Assessment    ICD-10   At high risk for breast cancer Z91.89    2.   Family history of breast cancer  Z80.3       Plan     At high risk for breast cancer.   - continue to f/u with  breast surgeon/high-risk breast specialist as recommended given increased breast cancer risk.    Family history of breast cancer   - Offered germline cancer-genetic testing at this time versus deferring testing at this time or declining testing altogether.  Various test panel options were discussed. Lenora agree to proceed with genetic testing through the 70-gene Multi-Cancer Panel (AIP, ALK, APC, JAYDEN, AXIN2, BAP1, BARD1, BLM, BMPR1A, BRCA1, BRCA2, BRIP1, CDC73, CDH1, CDK4, CDKN1B, CDKN2A, CHEK2, CTNNA1, DICER1, EGFR, EPCAM, FH, FLCN, GREM1, HOXB13, KIT, LZTR1, MAX, MBD4, MEN1, MET, MITF, MLH1, MSH2, MSH3, MSH6, MUTYH, NF1, NF2, NTHL1, PALB2, PDGFRA, PMS2, POLD1, POLE, POT1, QEZCB2M, PTCH1, PTEN, RAD51C, RAD51D, RB1, RET, SDHA, SDHAF2, SDHB, SDHC, SDHD, SMAD4, SMARCA4, SMARCB1, SMARCE1, STK11, SUFU, XNMQ068, TP53, TSC1, TSC2, VHL).  Lenora has provided her informed consent to proceed. A blood sample was collected in clinic today.     Questions were encouraged and answered to the patient's satisfaction, and she verbalized understanding of information and agreement with the plan.         Signed,    Nohemy Rosas MS, [facility] [place] [facility]  Licensed - Certified Genetic Counselor  [facility] - Breast Surgery    03/31/2025

## 2025-04-23 ENCOUNTER — PATIENT MESSAGE (OUTPATIENT)
Dept: SURGERY | Facility: CLINIC | Age: 20
End: 2025-04-23
Payer: COMMERCIAL